# Patient Record
(demographics unavailable — no encounter records)

---

## 2025-07-21 NOTE — ASSESSMENT
[FreeTextEntry1] : .  45 y/o F with anxiety, KATHARINA, presenting to establish care. HPI as above.  # anxiety- controlled - c/w current meds and therapy  # KATHARINA - KATHARINA dx 5/2025, didnt tolerate PO iron.; f/u labs and hematology referral - Reports heavy periods, unclear if hx of fibroids?, following with GYN and considering OCP vs HRT  # HCM - labs reviewed - abnormal mammo in past with marker placed, GYN following; encouraged to complete ordered imaging - pap UTD - never had scope, has GI but was hesitant for cope; encouraged to pursue scope  f/u 1-2 months [Vaccines Reviewed] : Immunizations reviewed today. Please see immunization details in the vaccine log within the immunization flowsheet.

## 2025-07-21 NOTE — HEALTH RISK ASSESSMENT
[0] : 2) Feeling down, depressed, or hopeless: Not at all (0) [PHQ-2 Negative - No further assessment needed] : PHQ-2 Negative - No further assessment needed [[id number]] : 0 [Never] : Never

## 2025-07-21 NOTE — HISTORY OF PRESENT ILLNESS
[de-identified] : 47 y/o F with anxiety, KATHARINA, presenting to establish care. KATHARINA dx 2025, didnt tolerate PO iron. Reports heavy periods, unclear if hx of fibroids?. No melena/hematochezia, hematuria, hemoptysis. Feeling generally well.  Sx Hx:  x2   Family Hx: NA   Social Hx: never smoker, no alcohol or illicit drugs. , 2 children.